# Patient Record
Sex: FEMALE | Race: BLACK OR AFRICAN AMERICAN | NOT HISPANIC OR LATINO | Employment: UNEMPLOYED | URBAN - METROPOLITAN AREA
[De-identification: names, ages, dates, MRNs, and addresses within clinical notes are randomized per-mention and may not be internally consistent; named-entity substitution may affect disease eponyms.]

---

## 2023-05-18 PROBLEM — M25.661 DECREASED RANGE OF MOTION OF BOTH KNEES: Status: ACTIVE | Noted: 2023-05-18

## 2023-05-18 PROBLEM — M25.561 KNEE PAIN, RIGHT: Status: ACTIVE | Noted: 2023-05-18

## 2023-05-18 PROBLEM — M17.0 PRIMARY OSTEOARTHRITIS OF BOTH KNEES: Status: ACTIVE | Noted: 2023-05-18

## 2023-05-18 PROBLEM — I10 HYPERTENSION: Status: ACTIVE | Noted: 2023-05-18

## 2023-05-18 PROBLEM — E55.9 VITAMIN D DEFICIENCY: Status: ACTIVE | Noted: 2023-05-18

## 2023-05-18 PROBLEM — E66.01 MORBID OBESITY (MULTI): Status: ACTIVE | Noted: 2023-05-18

## 2023-05-18 PROBLEM — R29.898 WEAKNESS OF RIGHT LOWER EXTREMITY: Status: ACTIVE | Noted: 2023-05-18

## 2023-05-18 PROBLEM — M25.662 DECREASED RANGE OF MOTION OF BOTH KNEES: Status: ACTIVE | Noted: 2023-05-18

## 2023-05-18 RX ORDER — HYDROCHLOROTHIAZIDE 25 MG/1
1 TABLET ORAL DAILY
COMMUNITY
Start: 2019-12-04 | End: 2023-05-23 | Stop reason: SDUPTHER

## 2023-05-18 RX ORDER — LOSARTAN POTASSIUM 100 MG/1
1 TABLET ORAL DAILY
COMMUNITY
Start: 2019-12-04 | End: 2023-05-23 | Stop reason: SDUPTHER

## 2023-05-18 RX ORDER — AMLODIPINE BESYLATE 2.5 MG/1
1 TABLET ORAL DAILY
COMMUNITY
Start: 2019-12-04 | End: 2023-05-23 | Stop reason: SDUPTHER

## 2023-05-18 RX ORDER — ERGOCALCIFEROL 1.25 MG/1
1 CAPSULE ORAL
COMMUNITY
Start: 2021-01-13 | End: 2023-05-23 | Stop reason: SDUPTHER

## 2023-05-23 ENCOUNTER — OFFICE VISIT (OUTPATIENT)
Dept: PRIMARY CARE | Facility: CLINIC | Age: 58
End: 2023-05-23
Payer: COMMERCIAL

## 2023-05-23 VITALS
BODY MASS INDEX: 51.91 KG/M2 | SYSTOLIC BLOOD PRESSURE: 150 MMHG | HEIGHT: 63 IN | HEART RATE: 70 BPM | WEIGHT: 293 LBS | DIASTOLIC BLOOD PRESSURE: 100 MMHG

## 2023-05-23 DIAGNOSIS — M19.90 OSTEOARTHRITIS, UNSPECIFIED OSTEOARTHRITIS TYPE, UNSPECIFIED SITE: ICD-10-CM

## 2023-05-23 DIAGNOSIS — E55.9 VITAMIN D DEFICIENCY: ICD-10-CM

## 2023-05-23 DIAGNOSIS — M17.0 PRIMARY OSTEOARTHRITIS OF BOTH KNEES: ICD-10-CM

## 2023-05-23 DIAGNOSIS — E66.01 MORBID OBESITY (MULTI): ICD-10-CM

## 2023-05-23 DIAGNOSIS — Z12.31 VISIT FOR SCREENING MAMMOGRAM: ICD-10-CM

## 2023-05-23 DIAGNOSIS — I10 PRIMARY HYPERTENSION: ICD-10-CM

## 2023-05-23 DIAGNOSIS — Z12.11 COLON CANCER SCREENING: Primary | ICD-10-CM

## 2023-05-23 PROCEDURE — 3077F SYST BP >= 140 MM HG: CPT | Performed by: CLINICAL NURSE SPECIALIST

## 2023-05-23 PROCEDURE — 1036F TOBACCO NON-USER: CPT | Performed by: CLINICAL NURSE SPECIALIST

## 2023-05-23 PROCEDURE — 3080F DIAST BP >= 90 MM HG: CPT | Performed by: CLINICAL NURSE SPECIALIST

## 2023-05-23 PROCEDURE — 99214 OFFICE O/P EST MOD 30 MIN: CPT | Performed by: CLINICAL NURSE SPECIALIST

## 2023-05-23 RX ORDER — LOSARTAN POTASSIUM 100 MG/1
100 TABLET ORAL DAILY
Qty: 30 TABLET | Refills: 11 | Status: SHIPPED | OUTPATIENT
Start: 2023-05-23 | End: 2024-05-22

## 2023-05-23 RX ORDER — ERGOCALCIFEROL 1.25 MG/1
1 CAPSULE ORAL
Qty: 4 CAPSULE | Refills: 11 | Status: SHIPPED | OUTPATIENT
Start: 2023-05-23 | End: 2024-05-22

## 2023-05-23 RX ORDER — AMLODIPINE BESYLATE 2.5 MG/1
2.5 TABLET ORAL DAILY
Qty: 30 TABLET | Refills: 11 | Status: SHIPPED | OUTPATIENT
Start: 2023-05-23 | End: 2024-05-22

## 2023-05-23 RX ORDER — HYDROCHLOROTHIAZIDE 25 MG/1
25 TABLET ORAL DAILY
Qty: 30 TABLET | Refills: 11 | Status: SHIPPED | OUTPATIENT
Start: 2023-05-23 | End: 2024-05-22

## 2023-05-23 ASSESSMENT — ENCOUNTER SYMPTOMS
BACK PAIN: 0
APPETITE CHANGE: 0
WHEEZING: 0
LOSS OF SENSATION IN FEET: 0
CHEST TIGHTNESS: 0
POLYDIPSIA: 0
PALPITATIONS: 0
ACTIVITY CHANGE: 0
ABDOMINAL PAIN: 0
CONSTIPATION: 0
EYE PAIN: 0
BRUISES/BLEEDS EASILY: 0
COUGH: 0
SLEEP DISTURBANCE: 0
JOINT SWELLING: 0
DYSURIA: 0
NAUSEA: 0
FATIGUE: 0
WOUND: 0
PHOTOPHOBIA: 0
UNEXPECTED WEIGHT CHANGE: 0
FLANK PAIN: 0
CHILLS: 0
VOMITING: 0
TROUBLE SWALLOWING: 0
NECK PAIN: 0
SEIZURES: 0
DEPRESSION: 0
ARTHRALGIAS: 1
CONFUSION: 0
OCCASIONAL FEELINGS OF UNSTEADINESS: 0
BLOOD IN STOOL: 0
DIZZINESS: 0
SHORTNESS OF BREATH: 0
SORE THROAT: 0
MYALGIAS: 0
HEADACHES: 0
FEVER: 0
DIARRHEA: 0
HEMATURIA: 0

## 2023-05-23 ASSESSMENT — PATIENT HEALTH QUESTIONNAIRE - PHQ9
2. FEELING DOWN, DEPRESSED OR HOPELESS: NOT AT ALL
1. LITTLE INTEREST OR PLEASURE IN DOING THINGS: NOT AT ALL
SUM OF ALL RESPONSES TO PHQ9 QUESTIONS 1 AND 2: 0

## 2023-05-23 ASSESSMENT — COLUMBIA-SUICIDE SEVERITY RATING SCALE - C-SSRS
6. HAVE YOU EVER DONE ANYTHING, STARTED TO DO ANYTHING, OR PREPARED TO DO ANYTHING TO END YOUR LIFE?: NO
1. IN THE PAST MONTH, HAVE YOU WISHED YOU WERE DEAD OR WISHED YOU COULD GO TO SLEEP AND NOT WAKE UP?: NO
2. HAVE YOU ACTUALLY HAD ANY THOUGHTS OF KILLING YOURSELF?: NO

## 2023-05-23 NOTE — PROGRESS NOTES
Subjective   Patient ID: Mansi Emerson is a 57 y.o. female who presents for Annual Exam (Wellness exam ).  HPI    Here today as a follow up appointment. Last seen in 2021.     Has been treated for Hypertension. Losartan, HCTZ and Norvasc. Denies any complaints of chest pain, shortness of breath, headaches. Tolerating medications but ran out of her blood pressure medication a few months ago. Without her medication, if sitting too long will have increased lower extremity edema.     Has been having worsening in her knee arthritis, followed with Orthopedics. Injection given, last done July 2022. Following with Dr. Strong, planning for bilateral knee replacements.     Review of Systems   Constitutional:  Negative for activity change, appetite change, chills, fatigue, fever and unexpected weight change.   HENT:  Negative for ear pain, hearing loss, nosebleeds, sore throat, tinnitus and trouble swallowing.    Eyes:  Negative for photophobia, pain and visual disturbance.   Respiratory:  Negative for cough, chest tightness, shortness of breath and wheezing.    Cardiovascular:  Positive for leg swelling. Negative for chest pain and palpitations.   Gastrointestinal:  Negative for abdominal pain, blood in stool, constipation, diarrhea, nausea and vomiting.   Endocrine: Negative for cold intolerance, heat intolerance, polydipsia and polyuria.   Genitourinary:  Negative for dysuria, flank pain and hematuria.   Musculoskeletal:  Positive for arthralgias. Negative for back pain, joint swelling, myalgias and neck pain.   Skin:  Negative for pallor, rash and wound.   Allergic/Immunologic: Negative for immunocompromised state.   Neurological:  Negative for dizziness, seizures and headaches.   Hematological:  Does not bruise/bleed easily.   Psychiatric/Behavioral:  Negative for confusion and sleep disturbance.        Objective   Physical Exam  Vitals and nursing note reviewed.   Constitutional:       General: She is not in acute  distress.     Appearance: Normal appearance.   HENT:      Head: Normocephalic.      Nose: Nose normal.   Eyes:      Conjunctiva/sclera: Conjunctivae normal.   Neck:      Vascular: No carotid bruit.   Cardiovascular:      Rate and Rhythm: Normal rate and regular rhythm.      Pulses: Normal pulses.      Heart sounds: Normal heart sounds.   Pulmonary:      Effort: Pulmonary effort is normal.      Breath sounds: Normal breath sounds.   Abdominal:      General: Bowel sounds are normal.      Palpations: Abdomen is soft.   Musculoskeletal:         General: Normal range of motion.      Cervical back: Normal range of motion.   Skin:     General: Skin is warm and dry.   Neurological:      Mental Status: She is alert and oriented to person, place, and time. Mental status is at baseline.   Psychiatric:         Mood and Affect: Mood normal.         Behavior: Behavior normal.       Assessment/Plan        New order for blood work provided at [facility] today.     Hypertension: Blood pressure uncontrolled at [facility] today. Restart Losartan, HCTZ, and Norvasc. DASH diet. Encouraged weight loss. CT Cardiac Scoring ordered.  Obesity: BMI: 54.03. Lifestyle changes recommended: Diet consisting of low fat foods, lean meats, high fiber, fresh fruits and vegetables. 150 min/ weekly aerobic exercise.   OA of Knees: Following with Orthopedics. Injection received in July 2022. Disability placard provided. Considering Bilateral Knee Replacements.   Vitamin D Deficiency: Restart Vitamin D as prescribed. Updated lab work ordered.     Mammogram: January 2021, negative. New order provided for 2023.   Cologuard ordered.   COVID Vaccine: March/April 2021.  December 2021.   Discussed Shingrix and Tdap.   Unable to update immunizations due to Insurance.

## 2023-07-05 ENCOUNTER — OFFICE VISIT (OUTPATIENT)
Dept: PRIMARY CARE | Facility: CLINIC | Age: 58
End: 2023-07-05
Payer: COMMERCIAL

## 2023-07-05 VITALS
WEIGHT: 293 LBS | HEIGHT: 63 IN | SYSTOLIC BLOOD PRESSURE: 120 MMHG | HEART RATE: 65 BPM | BODY MASS INDEX: 51.91 KG/M2 | DIASTOLIC BLOOD PRESSURE: 80 MMHG

## 2023-07-05 DIAGNOSIS — E55.9 VITAMIN D DEFICIENCY: ICD-10-CM

## 2023-07-05 DIAGNOSIS — M19.90 OSTEOARTHRITIS, UNSPECIFIED OSTEOARTHRITIS TYPE, UNSPECIFIED SITE: ICD-10-CM

## 2023-07-05 DIAGNOSIS — Z12.11 COLON CANCER SCREENING: ICD-10-CM

## 2023-07-05 DIAGNOSIS — I10 PRIMARY HYPERTENSION: ICD-10-CM

## 2023-07-05 DIAGNOSIS — E66.01 MORBID OBESITY (MULTI): Primary | ICD-10-CM

## 2023-07-05 DIAGNOSIS — Z12.31 VISIT FOR SCREENING MAMMOGRAM: ICD-10-CM

## 2023-07-05 PROCEDURE — 3074F SYST BP LT 130 MM HG: CPT | Performed by: CLINICAL NURSE SPECIALIST

## 2023-07-05 PROCEDURE — 3079F DIAST BP 80-89 MM HG: CPT | Performed by: CLINICAL NURSE SPECIALIST

## 2023-07-05 PROCEDURE — 99214 OFFICE O/P EST MOD 30 MIN: CPT | Performed by: CLINICAL NURSE SPECIALIST

## 2023-07-05 PROCEDURE — 1036F TOBACCO NON-USER: CPT | Performed by: CLINICAL NURSE SPECIALIST

## 2023-07-05 ASSESSMENT — ENCOUNTER SYMPTOMS
CHILLS: 0
BLOOD IN STOOL: 0
DEPRESSION: 0
UNEXPECTED WEIGHT CHANGE: 0
SLEEP DISTURBANCE: 0
SHORTNESS OF BREATH: 0
COUGH: 0
SEIZURES: 0
ACTIVITY CHANGE: 0
TROUBLE SWALLOWING: 0
PHOTOPHOBIA: 0
ABDOMINAL PAIN: 0
BACK PAIN: 0
NECK PAIN: 0
ARTHRALGIAS: 1
POLYDIPSIA: 0
PALPITATIONS: 0
DIARRHEA: 0
CONFUSION: 0
MYALGIAS: 0
WHEEZING: 0
HEADACHES: 0
DIZZINESS: 0
VOMITING: 0
EYE PAIN: 0
WOUND: 0
NAUSEA: 0
HEMATURIA: 0
FATIGUE: 0
FLANK PAIN: 0
CONSTIPATION: 0
BRUISES/BLEEDS EASILY: 0
APPETITE CHANGE: 0
DYSURIA: 0
CHEST TIGHTNESS: 0
SORE THROAT: 0
JOINT SWELLING: 0
OCCASIONAL FEELINGS OF UNSTEADINESS: 0
FEVER: 0
LOSS OF SENSATION IN FEET: 0

## 2023-07-05 ASSESSMENT — PATIENT HEALTH QUESTIONNAIRE - PHQ9
1. LITTLE INTEREST OR PLEASURE IN DOING THINGS: NOT AT ALL
SUM OF ALL RESPONSES TO PHQ9 QUESTIONS 1 AND 2: 0
2. FEELING DOWN, DEPRESSED OR HOPELESS: NOT AT ALL

## 2023-07-05 ASSESSMENT — COLUMBIA-SUICIDE SEVERITY RATING SCALE - C-SSRS
6. HAVE YOU EVER DONE ANYTHING, STARTED TO DO ANYTHING, OR PREPARED TO DO ANYTHING TO END YOUR LIFE?: NO
2. HAVE YOU ACTUALLY HAD ANY THOUGHTS OF KILLING YOURSELF?: NO
1. IN THE PAST MONTH, HAVE YOU WISHED YOU WERE DEAD OR WISHED YOU COULD GO TO SLEEP AND NOT WAKE UP?: NO

## 2023-07-05 NOTE — PROGRESS NOTES
Subjective   Patient ID: Mansi Emerson is a 57 y.o. female who presents for Follow-up (Follow up/Discuss potassium levels ).  HPI    Here today as a follow up appointment.      Has been treated for Hypertension. Losartan, Hydrochlorothiazide, and Norvasc. Denies any complaints of chest pain, shortness of breath, headaches. Tolerating medication.      Had been having worsening in her knee arthritis, followed with Orthopedics. Injection given. Bilateral knee replacements with Orthopedics from [place]. Done at [facility].  Planning to have Right knee Surgery done at the end of the month. Left Knee Surgery was done June 2023.     Review of Systems   Constitutional:  Negative for activity change, appetite change, chills, fatigue, fever and unexpected weight change.   HENT:  Negative for ear pain, hearing loss, nosebleeds, sore throat, tinnitus and trouble swallowing.    Eyes:  Negative for photophobia, pain and visual disturbance.   Respiratory:  Negative for cough, chest tightness, shortness of breath and wheezing.    Cardiovascular:  Negative for chest pain, palpitations and leg swelling.   Gastrointestinal:  Negative for abdominal pain, blood in stool, constipation, diarrhea, nausea and vomiting.   Endocrine: Negative for cold intolerance, heat intolerance, polydipsia and polyuria.   Genitourinary:  Negative for dysuria, flank pain and hematuria.   Musculoskeletal:  Positive for arthralgias. Negative for back pain, joint swelling, myalgias and neck pain.   Skin:  Negative for pallor, rash and wound.   Allergic/Immunologic: Negative for immunocompromised state.   Neurological:  Negative for dizziness, seizures and headaches.   Hematological:  Does not bruise/bleed easily.   Psychiatric/Behavioral:  Negative for confusion and sleep disturbance.        Objective   Physical Exam  Vitals and nursing note reviewed.   Constitutional:       General: She is not in acute distress.     Appearance: Normal appearance.    HENT:      Head: Normocephalic.      Nose: Nose normal.   Eyes:      Conjunctiva/sclera: Conjunctivae normal.   Neck:      Vascular: No carotid bruit.   Cardiovascular:      Rate and Rhythm: Normal rate and regular rhythm.      Pulses: Normal pulses.      Heart sounds: Normal heart sounds.   Pulmonary:      Effort: Pulmonary effort is normal.      Breath sounds: Normal breath sounds.   Abdominal:      General: Bowel sounds are normal.      Palpations: Abdomen is soft.   Musculoskeletal:         General: Normal range of motion.      Cervical back: Normal range of motion.   Skin:     General: Skin is warm and dry.   Neurological:      Mental Status: She is alert and oriented to person, place, and time. Mental status is at baseline.   Psychiatric:         Mood and Affect: Mood normal.         Behavior: Behavior normal.       Assessment/Plan         New order for blood work provided at [facility] today.      Hypertension: Blood pressure better controlled at [facility] today. Continue Losartan, HCTZ, and Norvasc. DASH diet. Encouraged weight loss. CT Cardiac Scoring ordered.  Obesity: BMI: 53.50. Lifestyle changes recommended: Diet consisting of low fat foods, lean meats, high fiber, fresh fruits and vegetables. 150 min/ weekly aerobic exercise.   OA of Knees: Following with Orthopedics. Injection received in July 2022. Disability placard provided. Left knee replacement June 2023. Right Knee Replacement July 2023.   Vitamin D Deficiency: Vitamin D as prescribed. Updated lab work ordered.      Mammogram: January 2021, negative. New order provided for 2023.   Cologuard ordered.   COVID Vaccine: March/April 2021.  December 2021.   Discussed Shingrix and Tdap.   Unable to update immunizations due to Insurance.

## 2023-07-19 ENCOUNTER — TELEPHONE (OUTPATIENT)
Dept: PRIMARY CARE | Facility: CLINIC | Age: 58
End: 2023-07-19
Payer: COMMERCIAL

## 2023-07-19 DIAGNOSIS — N39.0 URINARY TRACT INFECTION WITHOUT HEMATURIA, SITE UNSPECIFIED: Primary | ICD-10-CM

## 2023-07-19 RX ORDER — NITROFURANTOIN 25; 75 MG/1; MG/1
100 CAPSULE ORAL 2 TIMES DAILY
Qty: 10 CAPSULE | Refills: 0 | Status: SHIPPED | OUTPATIENT
Start: 2023-07-19 | End: 2023-07-24

## 2023-07-19 NOTE — TELEPHONE ENCOUNTER
Patient calling she is scheduled for 2nd knee surgery 7/31/23   She had pretesting at [facility] last week and had abn urine needs antibiotic  Surgeon Dr. Phillip Pro is suppose to send labs here  [phone]

## 2023-07-26 ENCOUNTER — LAB (OUTPATIENT)
Dept: LAB | Facility: LAB | Age: 58
End: 2023-07-26
Payer: COMMERCIAL

## 2023-07-26 DIAGNOSIS — N39.0 URINARY TRACT INFECTION WITHOUT HEMATURIA, SITE UNSPECIFIED: ICD-10-CM

## 2023-07-26 LAB
APPEARANCE, URINE: ABNORMAL
BACTERIA, URINE: ABNORMAL /HPF
BILIRUBIN, URINE: NEGATIVE
BLOOD, URINE: NEGATIVE
COLOR, URINE: YELLOW
GLUCOSE, URINE: NEGATIVE MG/DL
KETONES, URINE: NEGATIVE MG/DL
LEUKOCYTE ESTERASE, URINE: ABNORMAL
MUCUS, URINE: ABNORMAL /LPF
NITRITE, URINE: NEGATIVE
PH, URINE: 6 (ref 5–8)
PROTEIN, URINE: NEGATIVE MG/DL
RBC, URINE: 3 /HPF (ref 0–5)
SPECIFIC GRAVITY, URINE: 1.02 (ref 1–1.03)
SQUAMOUS EPITHELIAL CELLS, URINE: 12 /HPF
UROBILINOGEN, URINE: <2 MG/DL (ref 0–1.9)
WBC, URINE: 1 /HPF (ref 0–5)

## 2023-07-26 PROCEDURE — 87086 URINE CULTURE/COLONY COUNT: CPT

## 2023-07-26 PROCEDURE — 81001 URINALYSIS AUTO W/SCOPE: CPT

## 2023-07-27 LAB — URINE CULTURE: NORMAL

## 2023-07-28 ENCOUNTER — TELEPHONE (OUTPATIENT)
Dept: PRIMARY CARE | Facility: CLINIC | Age: 58
End: 2023-07-28
Payer: COMMERCIAL

## 2023-07-28 NOTE — TELEPHONE ENCOUNTER
----- Message from ADRIANE Cartwright-CNS sent at 7/27/2023  4:16 PM EDT -----  Please let patient know that repeat urine culture was negative. Thank you!

## 2023-10-04 ENCOUNTER — APPOINTMENT (OUTPATIENT)
Dept: PRIMARY CARE | Facility: CLINIC | Age: 58
End: 2023-10-04
Payer: COMMERCIAL

## 2023-10-23 ENCOUNTER — EVALUATION (OUTPATIENT)
Dept: PHYSICAL THERAPY | Facility: HOSPITAL | Age: 58
End: 2023-10-23
Payer: COMMERCIAL

## 2023-10-23 DIAGNOSIS — M17.0 BILATERAL PRIMARY OSTEOARTHRITIS OF KNEE: Primary | ICD-10-CM

## 2023-10-23 DIAGNOSIS — R29.898 LEG WEAKNESS, BILATERAL: ICD-10-CM

## 2023-10-23 DIAGNOSIS — M25.662 DECREASED RANGE OF MOTION OF BOTH KNEES: ICD-10-CM

## 2023-10-23 DIAGNOSIS — M25.661 DECREASED RANGE OF MOTION OF BOTH KNEES: ICD-10-CM

## 2023-10-23 PROCEDURE — 97161 PT EVAL LOW COMPLEX 20 MIN: CPT | Mod: GP

## 2023-10-23 PROCEDURE — 97140 MANUAL THERAPY 1/> REGIONS: CPT | Mod: GP

## 2023-10-23 ASSESSMENT — ENCOUNTER SYMPTOMS
DEPRESSION: 0
LOSS OF SENSATION IN FEET: 0
OCCASIONAL FEELINGS OF UNSTEADINESS: 0

## 2023-10-23 ASSESSMENT — PAIN SCALES - GENERAL: PAINLEVEL_OUTOF10: 0 - NO PAIN

## 2023-10-23 ASSESSMENT — PATIENT HEALTH QUESTIONNAIRE - PHQ9
SUM OF ALL RESPONSES TO PHQ9 QUESTIONS 1 AND 2: 0
1. LITTLE INTEREST OR PLEASURE IN DOING THINGS: NOT AT ALL
2. FEELING DOWN, DEPRESSED OR HOPELESS: NOT AT ALL

## 2023-10-23 ASSESSMENT — PAIN - FUNCTIONAL ASSESSMENT: PAIN_FUNCTIONAL_ASSESSMENT: 0-10

## 2023-10-23 ASSESSMENT — PAIN DESCRIPTION - DESCRIPTORS: DESCRIPTORS: ACHING;SORE;TENDER

## 2023-10-23 NOTE — PROGRESS NOTES
Physical Therapy    Physical Therapy Evaluation and Treatment      Patient Name: Mansi Emerson  MRN: [id number]  : 1965   Today's Date: 10/23/23  Time Calculation  Start Time: 1300  Stop Time: 1350  Time Calculation (min): 50 min            Current Problem:   1. Bilateral primary osteoarthritis of knee  Referral to Physical Therapy    Follow Up In Physical Therapy      2. Decreased range of motion of both knees  Follow Up In Physical Therapy      3. Leg weakness, bilateral  Follow Up In Physical Therapy          Subjective    General:  General  Reason for Referral: Increase strength, ROM and endurance for return to work  Referred By: Irving  Past Medical History Relevant to Rehab: SURESH TKA  Pt is a 56 yo female that has had SURESH knee replacements this year with most recent being the right on 23,  reports feeling really stiff, returns to work next week.  MD wanted pt to do more strengthening and endurance with return to work    Precautions:  Precautions  STEADI Fall Risk Score (The score of 4 or more indicates an increased risk of falling): 0       Pain:  Pain Assessment  Pain Assessment: 0-10  Pain Score: 0 - No pain  Pain Location: Knee  Pain Orientation: Other (Comment) (SURESH)  Pain Descriptors: Aching, Sore, Tender    Home Living  25 steps to get to apartment,      Prior Level of Function:  Prior Function Per Pt/Caregiver Report  Level of [place]:  (Independent with ADL's and ambulation without AD, working FT)    Objective   Sensation:      Intact and symmetrical in LE's  Strength:   Right Left   Hip:     Flexion 4/5 4+/5   Abduction 5/5 5/5   Adduction 5/5 5/5        Knee:     Flexion 4/5 5/5   Extension 4+/5 4/5        Ankle:     PF 5/5 5/5   DF 5/5            5/5      ROM:      Right Left    AROM AROM   Knee:     Flexion 104 106   Extension 0 0             Gait:     Pt ambulating with modified independence without assistive device. Ascends stairs reciprocally , can descend stairs reciprocally  at times but typically step to.    Outcome Measures:  Other Measures  Lower Extremity Funtional Score (LEFS): 47     Treatments:  EXERCISES Date 10/25/2023   Date  Date Date   VISIT# #1 # # #    REPS REPS REPS REPS          NuStep       Bike              Shuttle DLP                   SLP                                                                                                                                                   Manual: ROM, Joint Mobs, patella & scar mobs 12                           HEP     X           Education  Individual(s) Educated: Patient  Education Provided: POC, Home Exercise Program  Risk and Benefits Discussed with Patient/Caregiver/Other: yes  Patient/Caregiver Demonstrated Understanding: yes  Plan of Care Discussed and Agreed Upon: yes  Patient Response to Education: Patient/Caregiver Verbalized Understanding of Information, Patient/Caregiver Performed Return Demonstration of Exercises/Activities  Assessment:  PT Assessment  PT Assessment Results: Decreased strength, Decreased endurance, Decreased mobility, Pain, Decreased range of motion  Rehab Prognosis: Good  Plan:  OP PT Plan  Treatment/Interventions: Aquatic therapy, Education/ Instruction, Electrical stimulation, Manual therapy, Neuromuscular re-education, Therapeutic exercises, Therapeutic activities  PT Plan: Skilled PT  PT Frequency: 2 times per week  Duration: 4-6 wks  Onset Date: 07/31/23  Number of Treatments Authorized: 30 (Auth: 10/23/23 - 12/29/23)  Rehab Potential: Good  Plan of Care Agreement: Patient     Goals:  Active       PT Problem       PT Goal 1       Start:  10/25/23    Expected End:  11/08/23       Pt will demonstrate independence with HEP to reinforce gains made in treatment          PT Goal 2       Start:  10/25/23    Expected End:  11/08/23       Pt will tolerate 30 minutes of continuous exercise in aquatic environment in preparation for return to work         PT Goal 3       Start:  10/25/23    Expected  End:  11/22/23       Pt will have an increase in SURESH LE strength by 1/3 MMT grade to decrease difficulty with stairs         PT Goal 4       Start:  10/25/23    Expected End:  11/22/23       Pt will have an increase in SURESH knee flexion to 115 degrees to decrease difficulty with descending stairs

## 2023-10-23 NOTE — LETTER
October 25, 2023     Patient: Mansi Emerson   YOB: 1965   Date of Visit: 10/23/2023       To Whom It May Concern:    It is my medical opinion that Mansi Emerson {Work release (duty restriction):38811}.    If you have any questions or concerns, please don't hesitate to call.         Sincerely,        Tyra Richmond, PT    CC: No Recipients

## 2023-10-23 NOTE — LETTER
October 25, 2023     Patient: Mansi Emerson   YOB: 1965   Date of Visit: 10/23/2023       To Whom it May Concern:    Mansi Emerson was seen in my clinic on 10/23/2023. She {Return to school/sport:65486}.    If you have any questions or concerns, please don't hesitate to call.         Sincerely,          Tyra Richmond, PT        CC: No Recipients

## 2023-10-25 ENCOUNTER — TREATMENT (OUTPATIENT)
Dept: PHYSICAL THERAPY | Facility: HOSPITAL | Age: 58
End: 2023-10-25
Payer: COMMERCIAL

## 2023-10-25 DIAGNOSIS — M25.661 DECREASED RANGE OF MOTION OF BOTH KNEES: ICD-10-CM

## 2023-10-25 DIAGNOSIS — M17.0 BILATERAL PRIMARY OSTEOARTHRITIS OF KNEE: ICD-10-CM

## 2023-10-25 DIAGNOSIS — R29.898 LEG WEAKNESS, BILATERAL: ICD-10-CM

## 2023-10-25 DIAGNOSIS — M25.662 DECREASED RANGE OF MOTION OF BOTH KNEES: ICD-10-CM

## 2023-10-25 PROCEDURE — 97113 AQUATIC THERAPY/EXERCISES: CPT | Mod: GP,CQ

## 2023-10-25 ASSESSMENT — PAIN SCALES - GENERAL: PAINLEVEL_OUTOF10: 1

## 2023-10-25 ASSESSMENT — PAIN - FUNCTIONAL ASSESSMENT: PAIN_FUNCTIONAL_ASSESSMENT: 0-10

## 2023-10-25 ASSESSMENT — PAIN DESCRIPTION - DESCRIPTORS: DESCRIPTORS: DULL;SORE

## 2023-10-25 NOTE — Clinical Note
October 26, 2023    Tyra Richmond, PT  [address] [facility] [address]    Patient: Mansi Emerson   YOB: 1965   Date of Visit: 10/25/2023       Dear No referring provider defined for this encounter.    The attached plan of care is being sent to you because your patient’s medical reimbursement requires that you certify the plan of care. Your signature is required to allow uninterrupted insurance coverage.      You may indicate your approval by signing below and faxing this form back to us at Dept Fax: [phone].    Please call Dept: [phone] with any questions or concerns.    Thank you for this referral,        Gilberto Meng PTA  [address] [address]    Payer: Payor: [employer] / Plan: [employer] / Product Type: *No Product type* /                                                                         Date:     Dear Gilberto Meng PTA,     Re: Ms. Mansi Emerson, MRN:[id number]    I certify that I have reviewed the attached plan of care and it is medically necessary for Ms. Mansi Emerson (1965) who is under my care.          ______________________________________                    _________________  Provider name and credentials                                           Date and time                                                                                           Plan of Care 10/23/23   Effective from: 10/23/2023  Effective to: 1/24/2024    Plan ID: [id number]            Participants as of Finalize on 10/26/2023    Name Type Comments Contact Info    Tyra Richmond, PT Physical Therapist  [phone]       Last Plan Note     Author: Gilberto Meng PTA Status: Addendum Last edited: 10/25/2023  2:30 PM       Physical Therapy    Physical Therapy Treatment    Patient Name: Mansi Emerson  MRN: [id number]  Today's Date: 10/25/2023  Time Calculation  Start Time: 1430  Stop Time: 1515  Time  "Calculation (min): 45 min  VISIT 2      Assessment:  PT Assessment  Evaluation/Treatment Tolerance: Patient tolerated treatment well  Assessment Comment: good emeli of all exs pain 0/10 after all treatment    Plan:  OP PT Plan  PT Plan:  (progress strengthening in Nolberto LE)    Current Problem  1. Bilateral primary osteoarthritis of knee  Follow Up In Physical Therapy      2. Decreased range of motion of both knees  Follow Up In Physical Therapy      3. Leg weakness, bilateral  Follow Up In Physical Therapy          Subjective  pt reports pain 0-1/10 starting back to work next monday       Precautions  Precautions  Precautions Comment: none     Pain  Pain Assessment: 0-10  Pain Score: 1  Pain Type: Acute pain  Pain Location: Knee  Pain Orientation: Left, Right  Pain Descriptors: Dull, Sore  Pain Frequency: Rarely    Objective initiated aquatics        Treatments:     Aquatic Exercise  Aquatic Exercise Performed: Yes  Aquatic Exercise Activity 1: Amb Forw/Retro/Lat X 2 EA  Aquatic Exercise Activity 2: Marching and Lunging X 2 ea- ALT  Aquatic Exercise Activity 3: Squats X 20  Aquatic Exercise Activity 4: TR/HR X 20  Aquatic Exercise Activity 5: SLR X 3 -20 EA  Aquatic Exercise Activity 6: Step ups X 15 EA X 2 steps  Aquatic Exercise Activity 7: Gastroc and HS stretches 3 X 15\" EA  Aquatic Exercise Activity 8: Deep water Biking X 5'  Aquatic Exercise Activity 9: Step downs X20 EA      Plan:  OP PT Plan  Treatment/Interventions: Aquatic therapy, Education/ Instruction, Electrical stimulation, Manual therapy, Neuromuscular re-education, Therapeutic exercises, Therapeutic activities  PT Plan: Skilled PT  PT Frequency: 2 times per week  Duration: 4-6 wks  Onset Date: 07/31/23  Number of Treatments Authorized: 30 (Auth: 10/23/23 - 12/29/23)  Rehab Potential: Good  Plan of Care Agreement: Patient   Goals:  Active       PT Problem       PT Goal 1       Start:  10/25/23    Expected End:  11/08/23       Pt will demonstrate " independence with HEP to reinforce gains made in treatment          PT Goal 2       Start:  10/25/23    Expected End:  11/08/23       Pt will tolerate 30 minutes of continuous exercise in aquatic environment in preparation for return to work         PT Goal 3       Start:  10/25/23    Expected End:  11/22/23       Pt will have an increase in SURESH LE strength by 1/3 MMT grade to decrease difficulty with stairs         PT Goal 4       Start:  10/25/23    Expected End:  11/22/23       Pt will have an increase in SURESH knee flexion to 115 degrees to decrease difficulty with descending stairs                         Current Participants as of 10/26/2023    Name Type Comments Contact Info    Tyra Richmond, PT Physical Therapist  [phone]    Signature pending

## 2023-10-25 NOTE — PROGRESS NOTES
"Physical Therapy    Physical Therapy Treatment    Patient Name: Mansi Emerson  MRN: [id number]  Today's Date: 10/25/2023  Time Calculation  Start Time: 1430  Stop Time: 1515  Time Calculation (min): 45 min  VISIT 2      Assessment:  PT Assessment  Evaluation/Treatment Tolerance: Patient tolerated treatment well  Assessment Comment: good emeli of all exs pain 0/10 after all treatment    Plan:  OP PT Plan  PT Plan:  (progress strengthening in Nolberto LE)    Current Problem  1. Bilateral primary osteoarthritis of knee  Follow Up In Physical Therapy      2. Decreased range of motion of both knees  Follow Up In Physical Therapy      3. Leg weakness, bilateral  Follow Up In Physical Therapy          Subjective  pt reports pain 0-1/10 starting back to work next monday       Precautions  Precautions  Precautions Comment: none     Pain  Pain Assessment: 0-10  Pain Score: 1  Pain Type: Acute pain  Pain Location: Knee  Pain Orientation: Left, Right  Pain Descriptors: Dull, Sore  Pain Frequency: Rarely    Objective initiated aquatics        Treatments:     Aquatic Exercise  Aquatic Exercise Performed: Yes  Aquatic Exercise Activity 1: Amb Forw/Retro/Lat X 2 EA  Aquatic Exercise Activity 2: Marching and Lunging X 2 ea- ALT  Aquatic Exercise Activity 3: Squats X 20  Aquatic Exercise Activity 4: TR/HR X 20  Aquatic Exercise Activity 5: SLR X 3 -20 EA  Aquatic Exercise Activity 6: Step ups X 15 EA X 2 steps  Aquatic Exercise Activity 7: Gastroc and HS stretches 3 X 15\" EA  Aquatic Exercise Activity 8: Deep water Biking X 5'  Aquatic Exercise Activity 9: Step downs X20 EA      Plan:  OP PT Plan  Treatment/Interventions: Aquatic therapy, Education/ Instruction, Electrical stimulation, Manual therapy, Neuromuscular re-education, Therapeutic exercises, Therapeutic activities  PT Plan: Skilled PT  PT Frequency: 2 times per week  Duration: 4-6 wks  Onset Date: 07/31/23  Number of Treatments Authorized: 30 (Auth: 10/23/23 - 12/29/23)  Rehab " Potential: Good  Plan of Care Agreement: Patient   Goals:  Active       PT Problem       PT Goal 1       Start:  10/25/23    Expected End:  11/08/23       Pt will demonstrate independence with HEP to reinforce gains made in treatment          PT Goal 2       Start:  10/25/23    Expected End:  11/08/23       Pt will tolerate 30 minutes of continuous exercise in aquatic environment in preparation for return to work         PT Goal 3       Start:  10/25/23    Expected End:  11/22/23       Pt will have an increase in SURESH LE strength by 1/3 MMT grade to decrease difficulty with stairs         PT Goal 4       Start:  10/25/23    Expected End:  11/22/23       Pt will have an increase in SURESH knee flexion to 115 degrees to decrease difficulty with descending stairs

## 2023-10-26 NOTE — PROGRESS NOTES
Physical Therapy    Physical Therapy Evaluation and Treatment      Patient Name: Mansi Emerson  MRN: [id number]  : 1965   Today's Date: 10/23/23  Time Calculation  Start Time: 1300  Stop Time: 1350  Time Calculation (min): 50 min            Current Problem:   1. Bilateral primary osteoarthritis of knee  Referral to Physical Therapy    Follow Up In Physical Therapy      2. Decreased range of motion of both knees  Follow Up In Physical Therapy      3. Leg weakness, bilateral  Follow Up In Physical Therapy          Subjective    General:  General  Reason for Referral: Increase strength, ROM and endurance for return to work  Referred By: Irving  Past Medical History Relevant to Rehab: SURESH TKA  Pt is a 58 yo female that has had SURESH knee replacements this year with most recent being the right on 23,  reports feeling really stiff, returns to work next week.  MD wanted pt to do more strengthening and endurance with return to work    Precautions:  Precautions  STEADI Fall Risk Score (The score of 4 or more indicates an increased risk of falling): 0       Pain:  Pain Assessment  Pain Assessment: 0-10  Pain Score: 0 - No pain  Pain Location: Knee  Pain Orientation: Other (Comment) (SURESH)  Pain Descriptors: Aching, Sore, Tender    Home Living  25 steps to get to apartment,      Prior Level of Function:  Prior Function Per Pt/Caregiver Report  Level of [place]:  (Independent with ADL's and ambulation without AD, working FT)    Objective   Sensation:      Intact and symmetrical in LE's  Strength:   Right Left   Hip:     Flexion 4/5 4+/5   Abduction 5/5 5/5   Adduction 5/5 5/5        Knee:     Flexion 4/5 5/5   Extension 4+/5 4/5        Ankle:     PF 5/5 5/5   DF 5/5            5/5      ROM:      Right Left    AROM AROM   Knee:     Flexion 104 106   Extension 0 0             Gait:     Pt ambulating with modified independence without assistive device. Ascends stairs reciprocally , can descend stairs reciprocally  at times but typically step to.    Outcome Measures:  Other Measures  Lower Extremity Funtional Score (LEFS): 47     Treatments:  EXERCISES Date 10/26/2023   Date  Date Date   VISIT# #1 # # #    REPS REPS REPS REPS          NuStep       Bike              Shuttle DLP                   SLP                                                                                                                                                   Manual: ROM, Joint Mobs, patella & scar mobs 12                           HEP     X           Education  Individual(s) Educated: Patient  Education Provided: POC, Home Exercise Program  Risk and Benefits Discussed with Patient/Caregiver/Other: yes  Patient/Caregiver Demonstrated Understanding: yes  Plan of Care Discussed and Agreed Upon: yes  Patient Response to Education: Patient/Caregiver Verbalized Understanding of Information, Patient/Caregiver Performed Return Demonstration of Exercises/Activities  Assessment:  PT Assessment  PT Assessment Results: Decreased strength, Decreased endurance, Decreased mobility, Pain, Decreased range of motion  Rehab Prognosis: Good  Plan:  OP PT Plan  Treatment/Interventions: Aquatic therapy, Education/ Instruction, Electrical stimulation, Manual therapy, Neuromuscular re-education, Therapeutic exercises, Therapeutic activities  PT Plan: Skilled PT  PT Frequency: 2 times per week  Duration: 4-6 wks  Onset Date: 07/31/23  Number of Treatments Authorized: 30 (Auth: 10/23/23 - 12/29/23)  Rehab Potential: Good  Plan of Care Agreement: Patient     Goals:  Active       PT Problem       PT Goal 1       Start:  10/25/23    Expected End:  11/08/23       Pt will demonstrate independence with HEP to reinforce gains made in treatment          PT Goal 2       Start:  10/25/23    Expected End:  11/08/23       Pt will tolerate 30 minutes of continuous exercise in aquatic environment in preparation for return to work         PT Goal 3       Start:  10/25/23    Expected  End:  11/22/23       Pt will have an increase in SURESH LE strength by 1/3 MMT grade to decrease difficulty with stairs         PT Goal 4       Start:  10/25/23    Expected End:  11/22/23       Pt will have an increase in SURESH knee flexion to 115 degrees to decrease difficulty with descending stairs

## 2023-10-31 ENCOUNTER — LAB (OUTPATIENT)
Dept: LAB | Facility: LAB | Age: 58
End: 2023-10-31
Payer: COMMERCIAL

## 2023-10-31 DIAGNOSIS — E55.9 VITAMIN D DEFICIENCY: ICD-10-CM

## 2023-10-31 DIAGNOSIS — M19.90 OSTEOARTHRITIS, UNSPECIFIED OSTEOARTHRITIS TYPE, UNSPECIFIED SITE: ICD-10-CM

## 2023-10-31 DIAGNOSIS — Z12.11 COLON CANCER SCREENING: ICD-10-CM

## 2023-10-31 DIAGNOSIS — Z12.31 VISIT FOR SCREENING MAMMOGRAM: ICD-10-CM

## 2023-10-31 DIAGNOSIS — I10 PRIMARY HYPERTENSION: ICD-10-CM

## 2023-10-31 LAB
25(OH)D3 SERPL-MCNC: 31 NG/ML (ref 30–100)
ALBUMIN SERPL BCP-MCNC: 4.1 G/DL (ref 3.4–5)
ALP SERPL-CCNC: 95 U/L (ref 33–110)
ALT SERPL W P-5'-P-CCNC: 8 U/L (ref 7–45)
ANION GAP SERPL CALC-SCNC: 16 MMOL/L (ref 10–20)
AST SERPL W P-5'-P-CCNC: 20 U/L (ref 9–39)
BILIRUB SERPL-MCNC: 0.7 MG/DL (ref 0–1.2)
BUN SERPL-MCNC: 13 MG/DL (ref 6–23)
CALCIUM SERPL-MCNC: 9 MG/DL (ref 8.6–10.3)
CHLORIDE SERPL-SCNC: 103 MMOL/L (ref 98–107)
CHOLEST SERPL-MCNC: 158 MG/DL (ref 0–199)
CHOLESTEROL/HDL RATIO: 5
CO2 SERPL-SCNC: 28 MMOL/L (ref 21–32)
CREAT SERPL-MCNC: 0.83 MG/DL (ref 0.5–1.05)
ERYTHROCYTE [DISTWIDTH] IN BLOOD BY AUTOMATED COUNT: 14.5 % (ref 11.5–14.5)
GFR SERPL CREATININE-BSD FRML MDRD: 83 ML/MIN/1.73M*2
GLUCOSE SERPL-MCNC: 103 MG/DL (ref 74–99)
HCT VFR BLD AUTO: 40.1 % (ref 36–46)
HDLC SERPL-MCNC: 31.9 MG/DL
HGB BLD-MCNC: 11.9 G/DL (ref 12–16)
LDLC SERPL CALC-MCNC: 108 MG/DL
MCH RBC QN AUTO: 25.5 PG (ref 26–34)
MCHC RBC AUTO-ENTMCNC: 29.7 G/DL (ref 32–36)
MCV RBC AUTO: 86 FL (ref 80–100)
NON HDL CHOLESTEROL: 126 MG/DL (ref 0–149)
NRBC BLD-RTO: 0 /100 WBCS (ref 0–0)
PLATELET # BLD AUTO: 265 X10*3/UL (ref 150–450)
PMV BLD AUTO: 11.6 FL (ref 7.5–11.5)
POTASSIUM SERPL-SCNC: 3.6 MMOL/L (ref 3.5–5.3)
PROT SERPL-MCNC: 7.4 G/DL (ref 6.4–8.2)
RBC # BLD AUTO: 4.66 X10*6/UL (ref 4–5.2)
SODIUM SERPL-SCNC: 143 MMOL/L (ref 136–145)
TRIGL SERPL-MCNC: 90 MG/DL (ref 0–149)
TSH SERPL-ACNC: 1.48 MIU/L (ref 0.44–3.98)
VIT B12 SERPL-MCNC: 513 PG/ML (ref 211–911)
VLDL: 18 MG/DL (ref 0–40)
WBC # BLD AUTO: 5.3 X10*3/UL (ref 4.4–11.3)

## 2023-10-31 PROCEDURE — 82607 VITAMIN B-12: CPT

## 2023-10-31 PROCEDURE — 85027 COMPLETE CBC AUTOMATED: CPT

## 2023-10-31 PROCEDURE — 82306 VITAMIN D 25 HYDROXY: CPT

## 2023-10-31 PROCEDURE — 80053 COMPREHEN METABOLIC PANEL: CPT

## 2023-10-31 PROCEDURE — 36415 COLL VENOUS BLD VENIPUNCTURE: CPT

## 2023-10-31 PROCEDURE — 80061 LIPID PANEL: CPT

## 2023-10-31 PROCEDURE — 84443 ASSAY THYROID STIM HORMONE: CPT

## 2023-11-01 ENCOUNTER — APPOINTMENT (OUTPATIENT)
Dept: PHYSICAL THERAPY | Facility: HOSPITAL | Age: 58
End: 2023-11-01
Payer: COMMERCIAL

## 2023-11-06 ENCOUNTER — TREATMENT (OUTPATIENT)
Dept: PHYSICAL THERAPY | Facility: HOSPITAL | Age: 58
End: 2023-11-06
Payer: COMMERCIAL

## 2023-11-06 ENCOUNTER — APPOINTMENT (OUTPATIENT)
Dept: PHYSICAL THERAPY | Facility: HOSPITAL | Age: 58
End: 2023-11-06
Payer: COMMERCIAL

## 2023-11-06 DIAGNOSIS — R29.898 LEG WEAKNESS, BILATERAL: ICD-10-CM

## 2023-11-06 DIAGNOSIS — M25.661 DECREASED RANGE OF MOTION OF BOTH KNEES: ICD-10-CM

## 2023-11-06 DIAGNOSIS — M25.662 DECREASED RANGE OF MOTION OF BOTH KNEES: ICD-10-CM

## 2023-11-06 DIAGNOSIS — M17.0 BILATERAL PRIMARY OSTEOARTHRITIS OF KNEE: ICD-10-CM

## 2023-11-06 PROCEDURE — 97110 THERAPEUTIC EXERCISES: CPT | Mod: GP

## 2023-11-06 PROCEDURE — 97140 MANUAL THERAPY 1/> REGIONS: CPT | Mod: GP

## 2023-11-06 ASSESSMENT — PAIN SCALES - GENERAL: PAINLEVEL_OUTOF10: 0 - NO PAIN

## 2023-11-06 ASSESSMENT — PAIN - FUNCTIONAL ASSESSMENT: PAIN_FUNCTIONAL_ASSESSMENT: 0-10

## 2023-11-06 NOTE — PROGRESS NOTES
Physical Therapy    Physical Therapy Treatment    Patient Name: Mansi Emerson  MRN: [id number]  : 1965   Today's Date: 2023     Visit #3  Insurance: (10/23/2023-10/22/2024)           Current Problem  1. Bilateral primary osteoarthritis of knee  Follow Up In Physical Therapy      2. Decreased range of motion of both knees  Follow Up In Physical Therapy      3. Leg weakness, bilateral  Follow Up In Physical Therapy            Subjective      Pt reports no new complaints, just stiff today.  Pt reports that she had to cx an aquatic appt because her incision began to bleed    Precautions  Precautions  STEADI Fall Risk Score (The score of 4 or more indicates an increased risk of falling): 0       Pain  Pain Assessment: 0-10  Pain Score: 0 - No pain  Pain Type: Acute pain  Pain Location: Knee    Objective      Initiation of closed chain strengthening and Manual for ROM      Treatments:                     Date:  10/26   11/06                         VISIT# #1 #3 # #   EXERCISES REPS REPS REPS REPS          NuStep  L x10'     Bike              Shuttle DLP  8B x 15                 SLP  7B  x10            Q-Hip:   Abduct  3 x 10 x 15#                   Flexion  3 x 10 x 15#                   Extension  3 x 10 x 15#                                                                                                                     Manual: ROM, Joint Mobs, patella & scar mobs 12 15'                          HEP     X        Assessment:  Pt tolerated treatment without complaint of residual increase in symptoms, beginning to progress with POC  Plan:     Progress with strengthening and ROM in preparation for return to work         Goals:  Active       PT Problem       PT Goal 1       Start:  10/25/23    Expected End:  23       Pt will demonstrate independence with HEP to reinforce gains made in treatment          PT Goal 2       Start:  10/25/23    Expected End:  23       Pt will tolerate 30 minutes of  continuous exercise in aquatic environment in preparation for return to work         PT Goal 3       Start:  10/25/23    Expected End:  11/22/23       Pt will have an increase in SURESH LE strength by 1/3 MMT grade to decrease difficulty with stairs         PT Goal 4       Start:  10/25/23    Expected End:  11/22/23       Pt will have an increase in SURESH knee flexion to 115 degrees to decrease difficulty with descending stairs              .

## 2023-11-08 ENCOUNTER — APPOINTMENT (OUTPATIENT)
Dept: PHYSICAL THERAPY | Facility: HOSPITAL | Age: 58
End: 2023-11-08
Payer: COMMERCIAL

## 2023-11-08 ENCOUNTER — OFFICE VISIT (OUTPATIENT)
Dept: PRIMARY CARE | Facility: CLINIC | Age: 58
End: 2023-11-08
Payer: COMMERCIAL

## 2023-11-08 VITALS
DIASTOLIC BLOOD PRESSURE: 82 MMHG | SYSTOLIC BLOOD PRESSURE: 130 MMHG | BODY MASS INDEX: 53.92 KG/M2 | HEART RATE: 88 BPM | WEIGHT: 293 LBS | HEIGHT: 62 IN

## 2023-11-08 DIAGNOSIS — Z12.11 COLON CANCER SCREENING: ICD-10-CM

## 2023-11-08 DIAGNOSIS — E66.01 MORBID OBESITY (MULTI): ICD-10-CM

## 2023-11-08 DIAGNOSIS — M19.90 OSTEOARTHRITIS, UNSPECIFIED OSTEOARTHRITIS TYPE, UNSPECIFIED SITE: ICD-10-CM

## 2023-11-08 DIAGNOSIS — M17.0 PRIMARY OSTEOARTHRITIS OF BOTH KNEES: ICD-10-CM

## 2023-11-08 DIAGNOSIS — I10 PRIMARY HYPERTENSION: ICD-10-CM

## 2023-11-08 DIAGNOSIS — E55.9 VITAMIN D DEFICIENCY: ICD-10-CM

## 2023-11-08 DIAGNOSIS — J32.9 SINUSITIS, UNSPECIFIED CHRONICITY, UNSPECIFIED LOCATION: Primary | ICD-10-CM

## 2023-11-08 DIAGNOSIS — Z12.31 VISIT FOR SCREENING MAMMOGRAM: ICD-10-CM

## 2023-11-08 PROCEDURE — 1036F TOBACCO NON-USER: CPT | Performed by: CLINICAL NURSE SPECIALIST

## 2023-11-08 PROCEDURE — 99214 OFFICE O/P EST MOD 30 MIN: CPT | Performed by: CLINICAL NURSE SPECIALIST

## 2023-11-08 PROCEDURE — 3079F DIAST BP 80-89 MM HG: CPT | Performed by: CLINICAL NURSE SPECIALIST

## 2023-11-08 PROCEDURE — 3075F SYST BP GE 130 - 139MM HG: CPT | Performed by: CLINICAL NURSE SPECIALIST

## 2023-11-08 RX ORDER — OXYCODONE AND ACETAMINOPHEN 5; 325 MG/1; MG/1
1 TABLET ORAL NIGHTLY
COMMUNITY

## 2023-11-08 RX ORDER — FLUTICASONE PROPIONATE 50 MCG
1 SPRAY, SUSPENSION (ML) NASAL DAILY
Qty: 16 G | Refills: 0 | Status: SHIPPED | OUTPATIENT
Start: 2023-11-08 | End: 2024-11-07

## 2023-11-08 ASSESSMENT — PATIENT HEALTH QUESTIONNAIRE - PHQ9
1. LITTLE INTEREST OR PLEASURE IN DOING THINGS: NOT AT ALL
2. FEELING DOWN, DEPRESSED OR HOPELESS: NOT AT ALL
SUM OF ALL RESPONSES TO PHQ9 QUESTIONS 1 AND 2: 0

## 2023-11-08 ASSESSMENT — ENCOUNTER SYMPTOMS
SLEEP DISTURBANCE: 0
COUGH: 1
ABDOMINAL PAIN: 0
OCCASIONAL FEELINGS OF UNSTEADINESS: 0
DYSURIA: 0
SORE THROAT: 0
CHEST TIGHTNESS: 0
WHEEZING: 0
CONSTIPATION: 0
POLYDIPSIA: 0
CONFUSION: 0
BACK PAIN: 0
LOSS OF SENSATION IN FEET: 0
PALPITATIONS: 0
NECK PAIN: 0
DIARRHEA: 0
PHOTOPHOBIA: 0
FLANK PAIN: 0
HEADACHES: 0
ACTIVITY CHANGE: 0
NAUSEA: 0
UNEXPECTED WEIGHT CHANGE: 0
ARTHRALGIAS: 0
CHILLS: 0
BRUISES/BLEEDS EASILY: 0
SHORTNESS OF BREATH: 0
SEIZURES: 0
EYE PAIN: 0
FATIGUE: 0
WOUND: 0
FEVER: 0
HEMATURIA: 0
APPETITE CHANGE: 0
MYALGIAS: 0
JOINT SWELLING: 0
BLOOD IN STOOL: 0
TROUBLE SWALLOWING: 0
DEPRESSION: 0
DIZZINESS: 0
VOMITING: 0

## 2023-11-08 NOTE — PROGRESS NOTES
Subjective   Patient ID: Mansi Emerson is a 57 y.o. female who presents for Follow-up (Follow up/Cough, congestion X's 1 week Covid neg).  HPI    Here today as a follow up appointment.      Has been treated for Hypertension. Losartan, Hydrochlorothiazide, and Norvasc. Denies any complaints of chest pain, shortness of breath, headaches. Tolerating medication.      Had been having worsening in her knee arthritis, followed with Orthopedics. Injection given. Bilateral knee replacements with Orthopedics from [place]. Done at [facility]. Left Knee Surgery was done June 2023. Right knee was done July 2023.     Patient has been having some increased congestion for the past week. Has not been taking anything additional OTC. Unsure with her blood pressure. Denies any complaints of shortness of breath. Nasal congestion and drainage.     Review of Systems   Constitutional:  Negative for activity change, appetite change, chills, fatigue, fever and unexpected weight change.   HENT:  Negative for ear pain, hearing loss, nosebleeds, sore throat, tinnitus and trouble swallowing.    Eyes:  Negative for photophobia, pain and visual disturbance.   Respiratory:  Positive for cough. Negative for chest tightness, shortness of breath and wheezing.    Cardiovascular:  Negative for chest pain, palpitations and leg swelling.   Gastrointestinal:  Negative for abdominal pain, blood in stool, constipation, diarrhea, nausea and vomiting.   Endocrine: Negative for cold intolerance, heat intolerance, polydipsia and polyuria.   Genitourinary:  Negative for dysuria, flank pain and hematuria.   Musculoskeletal:  Negative for arthralgias, back pain, joint swelling, myalgias and neck pain.   Skin:  Negative for pallor, rash and wound.   Allergic/Immunologic: Negative for immunocompromised state.   Neurological:  Negative for dizziness, seizures and headaches.   Hematological:  Does not bruise/bleed easily.   Psychiatric/Behavioral:  Negative  for confusion and sleep disturbance.        Objective   Physical Exam  Vitals and nursing note reviewed.   Constitutional:       General: She is not in acute distress.     Appearance: Normal appearance.   HENT:      Head: Normocephalic.      Nose: Nose normal.   Eyes:      Conjunctiva/sclera: Conjunctivae normal.   Neck:      Vascular: No carotid bruit.   Cardiovascular:      Rate and Rhythm: Normal rate and regular rhythm.      Pulses: Normal pulses.      Heart sounds: Normal heart sounds.   Pulmonary:      Effort: Pulmonary effort is normal.      Breath sounds: Normal breath sounds.   Abdominal:      General: Bowel sounds are normal.      Palpations: Abdomen is soft.   Musculoskeletal:         General: Normal range of motion.      Cervical back: Normal range of motion.   Skin:     General: Skin is warm and dry.   Neurological:      Mental Status: She is alert and oriented to person, place, and time. Mental status is at baseline.   Psychiatric:         Mood and Affect: Mood normal.         Behavior: Behavior normal.       Assessment/Plan          New order for blood work provided at [facility] today.      Hypertension: Blood pressure borderline controlled  at [facility] today. Continue Losartan, HCTZ, and Norvasc. DASH diet. Encouraged weight loss. CT Cardiac Scoring reordered.  Obesity: BMI: 54.50. Lifestyle changes recommended: Diet consisting of low fat foods, lean meats, high fiber, fresh fruits and vegetables. 150 min/ weekly aerobic exercise.   OA of Knees: Following with Orthopedics. Injection received in July 2022. Disability placard provided. Left knee replacement June 2023. Right Knee Replacement July 2023.   Vitamin D Deficiency: Vitamin D as prescribed. Updated lab work ordered.   Sinusitis: Nasal spray as prescribed. Follow up with no improvement or worsening in symtpoms.      Mammogram: January 2021, negative. New order provided for 2023.   Cologuard ordered. Has kit at home.   COVID Vaccine: March/April 2021.   December 2021.   Discussed Shingrix and Tdap.   Unable to update immunizations due to Insurance.

## 2023-11-10 ENCOUNTER — APPOINTMENT (OUTPATIENT)
Dept: PHYSICAL THERAPY | Facility: HOSPITAL | Age: 58
End: 2023-11-10
Payer: COMMERCIAL

## 2023-11-13 ENCOUNTER — TREATMENT (OUTPATIENT)
Dept: PHYSICAL THERAPY | Facility: HOSPITAL | Age: 58
End: 2023-11-13
Payer: COMMERCIAL

## 2023-11-13 DIAGNOSIS — M17.0 BILATERAL PRIMARY OSTEOARTHRITIS OF KNEE: ICD-10-CM

## 2023-11-13 DIAGNOSIS — R29.898 LEG WEAKNESS, BILATERAL: ICD-10-CM

## 2023-11-13 DIAGNOSIS — M25.662 DECREASED RANGE OF MOTION OF BOTH KNEES: ICD-10-CM

## 2023-11-13 DIAGNOSIS — M25.661 DECREASED RANGE OF MOTION OF BOTH KNEES: ICD-10-CM

## 2023-11-13 PROCEDURE — 97113 AQUATIC THERAPY/EXERCISES: CPT | Mod: GP,CQ

## 2023-11-13 ASSESSMENT — PAIN DESCRIPTION - DESCRIPTORS: DESCRIPTORS: SORE

## 2023-11-13 ASSESSMENT — PAIN SCALES - GENERAL: PAINLEVEL_OUTOF10: 4

## 2023-11-13 ASSESSMENT — PAIN - FUNCTIONAL ASSESSMENT: PAIN_FUNCTIONAL_ASSESSMENT: 0-10

## 2023-11-13 NOTE — PROGRESS NOTES
"Physical Therapy    Physical Therapy Treatment    Patient Name: Mansi Emerson  MRN: [id number]  Today's Date: 11/13/2023  Time Calculation  Start Time: 1515  Stop Time: 1600  Time Calculation (min): 45 min  VISIT 4      Assessment:  PT Assessment  Assessment Comment: good emeli of all exs and increased amb, pain 1-2/10 after treatment    Plan:  OP PT Plan  PT Plan:  (progress for improved gait and LE strength)    Current Problem  1. Bilateral primary osteoarthritis of knee  Follow Up In Physical Therapy      2. Decreased range of motion of both knees  Follow Up In Physical Therapy      3. Leg weakness, bilateral  Follow Up In Physical Therapy          Subjective  pt reports came straight from work  a little tight and sore       Precautions  Precautions  Precautions Comment: none     Pain  Pain Assessment: 0-10  Pain Score: 4  Pain Type: Acute pain  Pain Location: Knee  Pain Orientation:  (Nolberto)  Pain Descriptors: Sore    Objective    Increased amb     Treatments:     Aquatic Exercise  Aquatic Exercise Performed: Yes  Aquatic Exercise Activity 1: Amb Forw/Retro/Lat X 3 EA  Aquatic Exercise Activity 2: Marching and Lunging X 2 ea- ALT  Aquatic Exercise Activity 3: Squats X 20  Aquatic Exercise Activity 4: TR/HR X 20  Aquatic Exercise Activity 5: SLR X 3 -20 EA  Aquatic Exercise Activity 6: Step ups X 15 EA X 2 steps  Aquatic Exercise Activity 7: Gastroc and HS stretches 3 X 15\" EA  Aquatic Exercise Activity 8: Deep water Biking X 5'  Aquatic Exercise Activity 9: Step downs X20 EA      Goals:  Active       PT Problem       PT Goal 1       Start:  10/25/23    Expected End:  11/08/23       Pt will demonstrate independence with HEP to reinforce gains made in treatment          PT Goal 2       Start:  10/25/23    Expected End:  11/08/23       Pt will tolerate 30 minutes of continuous exercise in aquatic environment in preparation for return to work         PT Goal 3       Start:  10/25/23    Expected End:  11/22/23       Pt " will have an increase in SURESH LE strength by 1/3 MMT grade to decrease difficulty with stairs         PT Goal 4       Start:  10/25/23    Expected End:  11/22/23       Pt will have an increase in SURESH knee flexion to 115 degrees to decrease difficulty with descending stairs

## 2023-11-15 ENCOUNTER — TREATMENT (OUTPATIENT)
Dept: PHYSICAL THERAPY | Facility: HOSPITAL | Age: 58
End: 2023-11-15
Payer: COMMERCIAL

## 2023-11-15 DIAGNOSIS — R29.898 LEG WEAKNESS, BILATERAL: ICD-10-CM

## 2023-11-15 DIAGNOSIS — M25.661 DECREASED RANGE OF MOTION OF BOTH KNEES: ICD-10-CM

## 2023-11-15 DIAGNOSIS — M25.662 DECREASED RANGE OF MOTION OF BOTH KNEES: ICD-10-CM

## 2023-11-15 DIAGNOSIS — M17.0 BILATERAL PRIMARY OSTEOARTHRITIS OF KNEE: ICD-10-CM

## 2023-11-15 PROCEDURE — 97140 MANUAL THERAPY 1/> REGIONS: CPT | Mod: GP

## 2023-11-15 PROCEDURE — 97110 THERAPEUTIC EXERCISES: CPT | Mod: GP

## 2023-11-15 ASSESSMENT — PAIN - FUNCTIONAL ASSESSMENT: PAIN_FUNCTIONAL_ASSESSMENT: 0-10

## 2023-11-15 ASSESSMENT — PAIN SCALES - GENERAL: PAINLEVEL_OUTOF10: 2

## 2023-11-15 NOTE — PROGRESS NOTES
Physical Therapy    Physical Therapy Treatment    Patient Name: Mansi Emerson  MRN: [id number]  : 1965   Today's Date: 2024  Time Calculation  Start Time: 802  Stop Time: 847  Time Calculation (min): 45 min  Visit #5            Current Problem  1. Bilateral primary osteoarthritis of knee  Follow Up In Physical Therapy      2. Decreased range of motion of both knees  Follow Up In Physical Therapy      3. Leg weakness, bilateral  Follow Up In Physical Therapy            Subjective      Pt reports no new complaints, denies falling, continues to c/o knee stiffness     Precautions   STEADI = 0       Pain   Knees - 2/10    Objective            Treatments:                     Date:  10/26   11/06 11/15                        VISIT# #1 #3 #5 #   EXERCISES REPS REPS REPS REPS          NuStep  L x10' L2 x 10    Bike              Shuttle DLP  8B x 15                 SLP  7B  x10 7B 2 x 10           Q-Hip:   Abduct  3 x 10 x 15# 2 x 10 x 20#                  Flexion  3 x 10 x 15# 2 x 10 x 20#                  Extension  3 x 10 x 15# 2 x 10 x 20#                                                                                                                    Manual: ROM, Joint Mobs, patella & scar mobs 12 15' 17'                         HEP     X        Assessment:  Pt tolerated treatment without complaint of  residual increase in symptoms, responding to treatment with increase in strength and endurance, progressing with ROM and strengthening        Plan:   Progress with ROM          Goals:  Active       PT Problem       PT Goal 1       Start:  10/25/23    Expected End:  23       Pt will demonstrate independence with HEP to reinforce gains made in treatment          PT Goal 2       Start:  10/25/23    Expected End:  23       Pt will tolerate 30 minutes of continuous exercise in aquatic environment in preparation for return to work         PT Goal 3       Start:  10/25/23    Expected End:  23        Pt will have an increase in SURESH LE strength by 1/3 MMT grade to decrease difficulty with stairs         PT Goal 4       Start:  10/25/23    Expected End:  11/22/23       Pt will have an increase in SURESH knee flexion to 115 degrees to decrease difficulty with descending stairs              .

## 2023-11-20 ENCOUNTER — TREATMENT (OUTPATIENT)
Dept: PHYSICAL THERAPY | Facility: HOSPITAL | Age: 58
End: 2023-11-20
Payer: COMMERCIAL

## 2023-11-20 DIAGNOSIS — R29.898 LEG WEAKNESS, BILATERAL: ICD-10-CM

## 2023-11-20 DIAGNOSIS — M17.0 BILATERAL PRIMARY OSTEOARTHRITIS OF KNEE: ICD-10-CM

## 2023-11-20 DIAGNOSIS — M25.661 DECREASED RANGE OF MOTION OF BOTH KNEES: ICD-10-CM

## 2023-11-20 DIAGNOSIS — M25.662 DECREASED RANGE OF MOTION OF BOTH KNEES: ICD-10-CM

## 2023-11-20 PROCEDURE — 97113 AQUATIC THERAPY/EXERCISES: CPT | Mod: GP,CQ

## 2023-11-20 ASSESSMENT — PAIN SCALES - GENERAL: PAINLEVEL_OUTOF10: 5 - MODERATE PAIN

## 2023-11-20 ASSESSMENT — PAIN - FUNCTIONAL ASSESSMENT: PAIN_FUNCTIONAL_ASSESSMENT: 0-10

## 2023-11-20 ASSESSMENT — PAIN DESCRIPTION - DESCRIPTORS: DESCRIPTORS: SORE

## 2023-11-20 NOTE — PROGRESS NOTES
"Physical Therapy    Physical Therapy Treatment    Patient Name: Mansi Emerson  MRN: [id number]  Today's Date: 11/20/2023  Time Calculation  Start Time: 1515  Stop Time: 1600  Time Calculation (min): 45 min  VISIT 6      Assessment:  PT Assessment  Assessment Comment: decreased tightness and pain after all exs, 3/10 improved gait pattern with amb    Plan:  OP PT Plan  PT Plan:  (progress reps next visit)    Current Problem  1. Bilateral primary osteoarthritis of knee  Follow Up In Physical Therapy      2. Decreased range of motion of both knees  Follow Up In Physical Therapy      3. Leg weakness, bilateral  Follow Up In Physical Therapy          Subjective     Pt reports a little more sore after work today    Precautions  Precautions  Precautions Comment: no issues     Pain  Pain Assessment: 0-10  Pain Score: 5 - Moderate pain  Pain Type: Acute pain  Pain Location: Knee  Pain Orientation:  (L > R Knee)  Pain Descriptors: Sore    Objective increased step downs        Treatments:     Aquatic Exercise  Aquatic Exercise Performed: Yes  Aquatic Exercise Activity 1: Amb Forw/Retro/Lat X 3 EA  Aquatic Exercise Activity 2: Marching and Lunging X 2 ea- ALT  Aquatic Exercise Activity 3: Squats X 25  Aquatic Exercise Activity 4: TR/HR X 25  Aquatic Exercise Activity 5: SLR X 3 -25 EA  Aquatic Exercise Activity 6: Step ups X 15 EA X 2 steps  Aquatic Exercise Activity 7: Gastroc and HS stretches 3 X 15\" EA  Aquatic Exercise Activity 8: Deep water Biking X 5'  Aquatic Exercise Activity 9: Step downs X20 EA      Goals:  Active       PT Problem       PT Goal 1       Start:  10/25/23    Expected End:  11/08/23       Pt will demonstrate independence with HEP to reinforce gains made in treatment          PT Goal 2       Start:  10/25/23    Expected End:  11/08/23       Pt will tolerate 30 minutes of continuous exercise in aquatic environment in preparation for return to work         PT Goal 3       Start:  10/25/23    Expected End:  " 11/22/23       Pt will have an increase in SURESH LE strength by 1/3 MMT grade to decrease difficulty with stairs         PT Goal 4       Start:  10/25/23    Expected End:  11/22/23       Pt will have an increase in SURESH knee flexion to 115 degrees to decrease difficulty with descending stairs

## 2023-11-22 ENCOUNTER — DOCUMENTATION (OUTPATIENT)
Dept: PHYSICAL THERAPY | Facility: HOSPITAL | Age: 58
End: 2023-11-22
Payer: COMMERCIAL

## 2023-11-22 ENCOUNTER — APPOINTMENT (OUTPATIENT)
Dept: PHYSICAL THERAPY | Facility: HOSPITAL | Age: 58
End: 2023-11-22
Payer: COMMERCIAL

## 2023-11-22 NOTE — PROGRESS NOTES
Physical Therapy                 Therapy Communication Note    Patient Name: Mansi Emerson  MRN: [id number]  Today's Date: 11/22/2023     Discipline: Physical Therapy    Missed Visit Reason:      Missed Time: No Show    Comment:

## 2023-11-22 NOTE — PROGRESS NOTES
Physical Therapy                 Therapy Communication Note    Patient Name: Mansi Emerson  MRN: [id number]  Today's Date: 11/22/2023     Discipline: Physical Therapy    Missed Visit Reason:      Missed Time: Cancel    Comment:

## 2023-11-27 ENCOUNTER — TREATMENT (OUTPATIENT)
Dept: PHYSICAL THERAPY | Facility: HOSPITAL | Age: 58
End: 2023-11-27
Payer: COMMERCIAL

## 2023-11-27 DIAGNOSIS — M17.0 BILATERAL PRIMARY OSTEOARTHRITIS OF KNEE: ICD-10-CM

## 2023-11-27 DIAGNOSIS — M25.661 DECREASED RANGE OF MOTION OF BOTH KNEES: ICD-10-CM

## 2023-11-27 DIAGNOSIS — M25.662 DECREASED RANGE OF MOTION OF BOTH KNEES: ICD-10-CM

## 2023-11-27 DIAGNOSIS — R29.898 LEG WEAKNESS, BILATERAL: ICD-10-CM

## 2023-11-27 PROCEDURE — 97113 AQUATIC THERAPY/EXERCISES: CPT | Mod: GP,CQ | Performed by: PHYSICAL THERAPY ASSISTANT

## 2023-11-27 ASSESSMENT — PAIN SCALES - GENERAL: PAINLEVEL_OUTOF10: 3

## 2023-11-27 ASSESSMENT — PAIN - FUNCTIONAL ASSESSMENT: PAIN_FUNCTIONAL_ASSESSMENT: 0-10

## 2023-11-27 ASSESSMENT — PAIN DESCRIPTION - DESCRIPTORS: DESCRIPTORS: TIGHTNESS

## 2023-11-27 NOTE — PROGRESS NOTES
"Physical Therapy    Physical Therapy Treatment    Patient Name: Mansi Emerson  MRN: [id number]  Today's Date: 11/27/2023  Time Calculation  Start Time: 0445  Stop Time: 0530  Time Calculation (min): 45 min  Visit #7    Assessment:  PT Assessment  Evaluation/Treatment Tolerance: Patient tolerated treatment well  Assessment Comment: pt tolerated treatment well. She demonstrated decreased pain and tightness.pt motivated to achieve all goals.  Plan:  OP PT Plan  Treatment/Interventions: Aquatic therapy, Education/ Instruction, Electrical stimulation, Manual therapy, Neuromuscular re-education, Therapeutic exercises, Therapeutic activities  PT Plan: Skilled PT (progress aquatic program as tolerated. Begin ambulation with dumbbells next session.)  PT Frequency: 2 times per week  Duration: 4-6 wk's  Onset Date: 07/31/23  Rehab Potential: Good    Current Problem  1. Bilateral primary osteoarthritis of knee  Follow Up In Physical Therapy      2. Decreased range of motion of both knees  Follow Up In Physical Therapy      3. Leg weakness, bilateral  Follow Up In Physical Therapy                   Subjective    Precautions  Precautions  Precautions Comment: no issues   Pain  Pain Assessment  Pain Assessment: 0-10  Pain Score: 3  Pain Type: Acute pain  Pain Location: Knee  Pain Orientation:  (L>R)  Pain Descriptors: Tightness    Objective increased reps as per flow sheet                Treatments:  Aquatic Exercise  Aquatic Exercise Performed: Yes  Aquatic Exercise Activity 1: Amb Forw/Retro/Lat X 3 EA  Aquatic Exercise Activity 2: Marching and Lunging X 2 ea- ALT  Aquatic Exercise Activity 3: Squats X 25  Aquatic Exercise Activity 4: TR/HR X 25  Aquatic Exercise Activity 5: SLR X 3 -25 EA  Aquatic Exercise Activity 6: Step ups X 20 EA X 2 steps reciprocal up/down  Aquatic Exercise Activity 7: Gastroc and HS stretches 4 x 20\" EA  Aquatic Exercise Activity 8: Deep water Biking X 5'  Aquatic Exercise Activity 9: Step downs X 20 " VERONICA    OP EDUCATION: educated on stretches.        Goals:  Active       PT Problem       PT Goal 1       Start:  10/25/23    Expected End:  11/08/23       Pt will demonstrate independence with HEP to reinforce gains made in treatment          PT Goal 2       Start:  10/25/23    Expected End:  11/08/23       Pt will tolerate 30 minutes of continuous exercise in aquatic environment in preparation for return to work         PT Goal 3       Start:  10/25/23    Expected End:  11/22/23       Pt will have an increase in SURESH LE strength by 1/3 MMT grade to decrease difficulty with stairs         PT Goal 4       Start:  10/25/23    Expected End:  11/22/23       Pt will have an increase in SURESH knee flexion to 115 degrees to decrease difficulty with descending stairs

## 2023-11-29 ENCOUNTER — DOCUMENTATION (OUTPATIENT)
Dept: PHYSICAL THERAPY | Facility: HOSPITAL | Age: 58
End: 2023-11-29

## 2023-11-29 ENCOUNTER — DOCUMENTATION (OUTPATIENT)
Dept: PHYSICAL THERAPY | Facility: HOSPITAL | Age: 58
End: 2023-11-29
Payer: COMMERCIAL

## 2023-11-29 NOTE — PROGRESS NOTES
Physical Therapy                 Therapy Communication Note    Patient Name: Mansi Emerson  MRN: [id number]  Today's Date: 11/29/2023     Discipline: Physical Therapy    Missed Visit Reason:      Missed Time: No Show    Comment:

## 2023-12-06 ENCOUNTER — DOCUMENTATION (OUTPATIENT)
Dept: PHYSICAL THERAPY | Facility: HOSPITAL | Age: 58
End: 2023-12-06
Payer: COMMERCIAL

## 2023-12-06 NOTE — PROGRESS NOTES
Physical Therapy                 Therapy Communication Note    Patient Name: Mansi Emerson  MRN: [id number]  Today's Date: 12/6/2023     Discipline: Physical Therapy    Missed Visit Reason:      Missed Time: No Show    Comment:Left VM

## 2023-12-06 NOTE — PROGRESS NOTES
Physical Therapy                 Therapy Communication Note    Patient Name: Mansi Emerson  MRN: [id number]  Today's Date: 12/6/2023     Discipline: Physical Therapy    Missed Visit Reason:      Missed Time: No Show    Comment: PT called and left pt voicemail.

## 2023-12-08 ENCOUNTER — DOCUMENTATION (OUTPATIENT)
Dept: PHYSICAL THERAPY | Facility: HOSPITAL | Age: 58
End: 2023-12-08
Payer: COMMERCIAL

## 2023-12-08 NOTE — PROGRESS NOTES
Physical Therapy                 Therapy Communication Note    Patient Name: Mansi Emerson  MRN: [id number]  Today's Date: 12/8/2023     Discipline: Physical Therapy    Missed Visit Reason:      Missed Time: No Show    Comment:

## 2023-12-08 NOTE — PROGRESS NOTES
Physical Therapy  Physical Therapy      Discharge Summary    Name: Mansi Emerson  MRN: [id number]  : 1965  Date: 23    Discharge Summary: PT    Discharge Information: Date of discharge 2023, Date of last visit 23, Date of evaluation 10/23/23, Number of attended visits 7, Referred by Dr Villatoro, and Referred for Strengthening and ROM      Rehab Discharge Reason: Failed to schedule and/or keep follow-up appointment(s)

## 2023-12-18 ENCOUNTER — TELEPHONE (OUTPATIENT)
Dept: PRIMARY CARE | Facility: CLINIC | Age: 58
End: 2023-12-18
Payer: COMMERCIAL

## 2023-12-18 DIAGNOSIS — R05.9 COUGH, UNSPECIFIED TYPE: Primary | ICD-10-CM

## 2023-12-18 RX ORDER — ALBUTEROL SULFATE 90 UG/1
2 AEROSOL, METERED RESPIRATORY (INHALATION) EVERY 4 HOURS PRN
Qty: 8 G | Refills: 0 | Status: SHIPPED | OUTPATIENT
Start: 2023-12-18 | End: 2024-12-17

## 2023-12-18 NOTE — LETTER
December 18, 2023     Patient: Mansi Emerson   YOB: 1965   Date of Visit: 12/18/2023       To Whom It May Concern:     Please excuse Mansi for her absence from 12/18/2023 and 12/19/2023 Return 12/20/2023  If you have any questions or concerns, please don't hesitate to call.         Sincerely,         Iman Mar, APRN-CNS        CC: Mansi Emerson

## 2023-12-18 NOTE — TELEPHONE ENCOUNTER
Has only been 4 days, may be Viral. Will send Albuterol to help with Cough. Can use OTC Antihistamines and nasal spray to help with congestion. Follow up if symptoms persist.

## 2023-12-18 NOTE — TELEPHONE ENCOUNTER
Chief Complaint:    Symptoms: Stuffy nose, congestion, Cough, Back / Chest hurts, fever broke Friday    Duration: 4 days    Treatments: nothing    Patient Requesting:  Recommendations    Covid Test: Negative x 2    Did patient have Covid Vaccine?    Recent covid booster:      Pharmacy: [facility]

## 2023-12-18 NOTE — TELEPHONE ENCOUNTER
Patient notified. Asking for a work note for today./ States she called off today thinking you would of seen her today.

## 2023-12-29 ENCOUNTER — OFFICE VISIT (OUTPATIENT)
Dept: PRIMARY CARE | Facility: CLINIC | Age: 58
End: 2023-12-29
Payer: COMMERCIAL

## 2023-12-29 VITALS
HEART RATE: 88 BPM | WEIGHT: 287 LBS | DIASTOLIC BLOOD PRESSURE: 72 MMHG | HEIGHT: 62 IN | BODY MASS INDEX: 52.81 KG/M2 | SYSTOLIC BLOOD PRESSURE: 138 MMHG

## 2023-12-29 DIAGNOSIS — B02.9 HERPES ZOSTER WITHOUT COMPLICATION: Primary | ICD-10-CM

## 2023-12-29 PROCEDURE — 3075F SYST BP GE 130 - 139MM HG: CPT | Performed by: CLINICAL NURSE SPECIALIST

## 2023-12-29 PROCEDURE — 3078F DIAST BP <80 MM HG: CPT | Performed by: CLINICAL NURSE SPECIALIST

## 2023-12-29 PROCEDURE — 1036F TOBACCO NON-USER: CPT | Performed by: CLINICAL NURSE SPECIALIST

## 2023-12-29 PROCEDURE — 99213 OFFICE O/P EST LOW 20 MIN: CPT | Performed by: CLINICAL NURSE SPECIALIST

## 2023-12-29 RX ORDER — VALACYCLOVIR HYDROCHLORIDE 1 G/1
1000 TABLET, FILM COATED ORAL 3 TIMES DAILY
Qty: 21 TABLET | Refills: 0 | Status: SHIPPED | OUTPATIENT
Start: 2023-12-29 | End: 2024-01-05

## 2023-12-29 RX ORDER — LIDOCAINE 50 MG/G
1 PATCH TOPICAL DAILY
Qty: 30 PATCH | Refills: 0 | Status: SHIPPED | OUTPATIENT
Start: 2023-12-29 | End: 2023-12-29 | Stop reason: ALTCHOICE

## 2023-12-29 RX ORDER — LIDOCAINE 50 MG/G
OINTMENT TOPICAL AS NEEDED
Qty: 50 G | Refills: 1 | Status: SHIPPED | OUTPATIENT
Start: 2023-12-29 | End: 2024-01-02 | Stop reason: SDUPTHER

## 2023-12-29 ASSESSMENT — ENCOUNTER SYMPTOMS
TROUBLE SWALLOWING: 0
FLANK PAIN: 0
DIZZINESS: 0
ABDOMINAL PAIN: 0
BACK PAIN: 1
SORE THROAT: 0
PHOTOPHOBIA: 0
MYALGIAS: 0
SEIZURES: 0
HEMATURIA: 0
HEADACHES: 0
NECK PAIN: 0
POLYDIPSIA: 0
SHORTNESS OF BREATH: 0
WHEEZING: 0
CHILLS: 0
CONSTIPATION: 0
APPETITE CHANGE: 0
COUGH: 0
UNEXPECTED WEIGHT CHANGE: 0
PALPITATIONS: 0
VOMITING: 0
CONFUSION: 0
BLOOD IN STOOL: 0
OCCASIONAL FEELINGS OF UNSTEADINESS: 0
DEPRESSION: 0
LOSS OF SENSATION IN FEET: 0
ACTIVITY CHANGE: 0
WOUND: 0
EYE PAIN: 0
ARTHRALGIAS: 0
SLEEP DISTURBANCE: 0
JOINT SWELLING: 0
CHEST TIGHTNESS: 0
BRUISES/BLEEDS EASILY: 0
DIARRHEA: 0
FATIGUE: 0
FEVER: 0
DYSURIA: 0
NAUSEA: 0

## 2023-12-29 NOTE — PROGRESS NOTES
"Subjective   Patient ID: Mansi Emerson is a 58 y.o. female who presents for Acute visit (Discuss possible shingles on back moving to front, very painful ).  HPI    Patient presents in the office today with concern for possible Shingles. States that she has been having pain moving from the back to the front. Increased pain and discomfort. Increased rash, that is burning. Has not been applying anything topical at this time for the discomfort. States that rash has been spreading, increased \"bumps.\"     Review of Systems   Constitutional:  Negative for activity change, appetite change, chills, fatigue, fever and unexpected weight change.   HENT:  Negative for ear pain, hearing loss, nosebleeds, sore throat, tinnitus and trouble swallowing.    Eyes:  Negative for photophobia, pain and visual disturbance.   Respiratory:  Negative for cough, chest tightness, shortness of breath and wheezing.    Cardiovascular:  Negative for chest pain, palpitations and leg swelling.   Gastrointestinal:  Negative for abdominal pain, blood in stool, constipation, diarrhea, nausea and vomiting.   Endocrine: Negative for cold intolerance, heat intolerance, polydipsia and polyuria.   Genitourinary:  Negative for dysuria, flank pain and hematuria.   Musculoskeletal:  Positive for back pain. Negative for arthralgias, joint swelling, myalgias and neck pain.   Skin:  Positive for rash. Negative for pallor and wound.   Allergic/Immunologic: Negative for immunocompromised state.   Neurological:  Negative for dizziness, seizures and headaches.   Hematological:  Does not bruise/bleed easily.   Psychiatric/Behavioral:  Negative for confusion and sleep disturbance.        Objective   Physical Exam  Vitals and nursing note reviewed.   Constitutional:       General: She is not in acute distress.     Appearance: Normal appearance.   HENT:      Head: Normocephalic.      Nose: Nose normal.   Eyes:      Conjunctiva/sclera: Conjunctivae normal.   Neck:      " Vascular: No carotid bruit.   Cardiovascular:      Rate and Rhythm: Normal rate and regular rhythm.      Pulses: Normal pulses.      Heart sounds: Normal heart sounds.   Pulmonary:      Effort: Pulmonary effort is normal.      Breath sounds: Normal breath sounds.   Abdominal:      General: Bowel sounds are normal.      Palpations: Abdomen is soft.   Musculoskeletal:         General: Normal range of motion.      Cervical back: Normal range of motion.   Skin:     General: Skin is warm and dry.      Findings: Rash present.   Neurological:      Mental Status: She is alert and oriented to person, place, and time. Mental status is at baseline.   Psychiatric:         Mood and Affect: Mood normal.         Behavior: Behavior normal.         Assessment/Plan        Shingles: Antiviral and topical as prescribed. Follow up with no improvement or worsening in symptoms. Can use OTC pain relievers as needed.     Iman Mar, ADRIANE-CNS 12/29/23 8:41 AM

## 2024-01-02 ENCOUNTER — TELEPHONE (OUTPATIENT)
Dept: PRIMARY CARE | Facility: CLINIC | Age: 59
End: 2024-01-02
Payer: COMMERCIAL

## 2024-01-02 DIAGNOSIS — B02.9 HERPES ZOSTER WITHOUT COMPLICATION: ICD-10-CM

## 2024-01-02 RX ORDER — LIDOCAINE 50 MG/G
OINTMENT TOPICAL 2 TIMES DAILY PRN
Qty: 50 G | Refills: 1 | Status: SHIPPED | OUTPATIENT
Start: 2024-01-02 | End: 2025-01-01

## 2024-01-02 NOTE — TELEPHONE ENCOUNTER
The pharmacy wouldn't fill the Lidocaine patch. They told her there were no instructions for use on the script.    Please resend to Malik Fontenot

## 2024-07-13 DIAGNOSIS — Z12.31 VISIT FOR SCREENING MAMMOGRAM: ICD-10-CM

## 2024-07-13 DIAGNOSIS — E66.01 MORBID OBESITY (MULTI): ICD-10-CM

## 2024-07-13 DIAGNOSIS — I10 PRIMARY HYPERTENSION: ICD-10-CM

## 2024-07-13 DIAGNOSIS — E55.9 VITAMIN D DEFICIENCY: ICD-10-CM

## 2024-07-13 DIAGNOSIS — M17.0 PRIMARY OSTEOARTHRITIS OF BOTH KNEES: ICD-10-CM

## 2024-07-13 DIAGNOSIS — Z12.11 COLON CANCER SCREENING: ICD-10-CM

## 2024-07-13 DIAGNOSIS — M19.90 OSTEOARTHRITIS, UNSPECIFIED OSTEOARTHRITIS TYPE, UNSPECIFIED SITE: ICD-10-CM

## 2024-07-15 RX ORDER — ERGOCALCIFEROL 1.25 MG/1
CAPSULE ORAL
Qty: 4 CAPSULE | Refills: 0 | OUTPATIENT
Start: 2024-07-15

## 2024-07-15 RX ORDER — AMLODIPINE BESYLATE 2.5 MG/1
2.5 TABLET ORAL DAILY
Qty: 30 TABLET | Refills: 0 | OUTPATIENT
Start: 2024-07-15

## 2024-07-15 RX ORDER — LOSARTAN POTASSIUM 100 MG/1
100 TABLET ORAL DAILY
Qty: 30 TABLET | Refills: 0 | OUTPATIENT
Start: 2024-07-15 | End: 2025-07-15